# Patient Record
Sex: FEMALE | Race: WHITE | NOT HISPANIC OR LATINO | Employment: FULL TIME | URBAN - METROPOLITAN AREA
[De-identification: names, ages, dates, MRNs, and addresses within clinical notes are randomized per-mention and may not be internally consistent; named-entity substitution may affect disease eponyms.]

---

## 2017-06-16 ENCOUNTER — OFFICE VISIT (OUTPATIENT)
Dept: URGENT CARE | Facility: URGENT CARE | Age: 37
End: 2017-06-16
Payer: COMMERCIAL

## 2017-06-16 VITALS
WEIGHT: 128.4 LBS | OXYGEN SATURATION: 98 % | TEMPERATURE: 98.3 F | SYSTOLIC BLOOD PRESSURE: 102 MMHG | DIASTOLIC BLOOD PRESSURE: 70 MMHG | HEART RATE: 86 BPM

## 2017-06-16 DIAGNOSIS — J20.9 ACUTE BRONCHITIS, UNSPECIFIED ORGANISM: ICD-10-CM

## 2017-06-16 DIAGNOSIS — H10.11 ALLERGIC CONJUNCTIVITIS, RIGHT EYE: Primary | ICD-10-CM

## 2017-06-16 PROCEDURE — 99213 OFFICE O/P EST LOW 20 MIN: CPT | Performed by: PHYSICIAN ASSISTANT

## 2017-06-16 RX ORDER — OLOPATADINE HYDROCHLORIDE 2 MG/ML
1 SOLUTION/ DROPS OPHTHALMIC DAILY
Qty: 2.5 ML | Refills: 0 | Status: SHIPPED | OUTPATIENT
Start: 2017-06-16

## 2017-06-16 ASSESSMENT — ENCOUNTER SYMPTOMS
HEADACHES: 0
COUGH: 1
FOCAL WEAKNESS: 0
FEVER: 0
DIARRHEA: 0
ABDOMINAL PAIN: 0
EYE DISCHARGE: 1
CHILLS: 0
VOMITING: 0
EYE PAIN: 0
NAUSEA: 0
EYE REDNESS: 1
SHORTNESS OF BREATH: 0

## 2017-06-16 NOTE — MR AVS SNAPSHOT
After Visit Summary   6/16/2017    Alan Montejo    MRN: [id number]           Patient Information     Date Of Birth          1980        Visit Information        Provider Department      6/16/2017 1:30 PM Felecia Oconnell PA-C [facility]        Today's Diagnoses     Allergic conjunctivitis, right eye    -  1    Acute bronchitis, unspecified organism          Care Instructions      Conjunctivitis, Allergic    Conjunctivitis is an irritation of a thin membrane in the eye. This membrane is called the conjunctiva. It covers the white of the eye and the inside of the eyelid. The condition is often known as  pink eye  or  red eye  because the eye looks pink or red. The eye can also be swollen. A thick fluid may leak from the eyelid. The eye may itch and burn, and feel gritty or scratchy.  Allergic conjunctivitis is caused by an allergen. Allergens are substances that cause the body to react with certain symptoms. Allergens that cause eye irritation include things such as house dust or pollen in the air. This can occur seasonally, most often in the spring.  Home care    Eye drops may be prescribed to reduce itching and redness. Use these as directed. Otherwise, over-the-counter decongestant eye drops may be used.    Apply a cool compress (towel soaked in cool water) to the affected eye 3 to 4 times a day to reduce swelling and itching.    It is common to have mucus drainage during the night, causing the eyelids to become crusted by morning. Use a warm, wet cloth to wipe this away. You may also use saline irrigating solution or artificial tears to rinse away mucus in the eye. Do not patch the eye.    You may use acetaminophen or ibuprofen to control pain, unless another medicine was prescribed. (Note: If you have chronic liver or kidney disease, or if you have ever had a stomach ulcer or gastrointestinal bleeding, talk with your healthcare provider before using these  medicines.)    Do not wear contact lenses until your eyes have healed and all symptoms are gone.  Follow-up care  Follow up with your healthcare provider, or as advised.  When to seek medical advice  Call your healthcare provider right away if any of these occur:    Increased eyelid swelling    New or worsening drainage from the eye    Increasing redness around the eye    Facial swelling  Date Last Reviewed: 6/14/2015 2000-2017 [employer]. [address] [place] [address], [place] [address]. All rights reserved. This information is not intended as a substitute for professional medical care. Always follow your healthcare professional's instructions.      Viral Upper Respiratory Illness (Adult)  You have a viral upper respiratory illness (URI), which is another term for the common cold. This illness is contagious during the first few days. It is spread through the air by coughing and sneezing. It may also be spread by direct contact (touching the sick person and then touching your own eyes, nose, or mouth). Frequent handwashing will decrease risk of spread. Most viral illnesses go away within 7 to 10 days with rest and simple home remedies. Sometimes the illness may last for several weeks. Antibiotics will not kill a virus, and they are generally not prescribed for this condition.    Home care    If symptoms are severe, rest at home for the first 2 to 3 days. When you resume activity, don't let yourself get too tired.    Avoid being exposed to cigarette smoke (yours or others ).    You may use acetaminophen or ibuprofen to control pain and fever, unless another medicine was prescribed. (Note: If you have chronic liver or kidney disease, have ever had a stomach ulcer or gastrointestinal bleeding, or are taking blood-thinning medicines, talk with your healthcare provider before using these medicines.) Aspirin should never be given to anyone under 18 years of age who is ill with a viral infection or fever. It may  cause severe liver or brain damage.    Your appetite may be poor, so a light diet is fine. Avoid dehydration by drinking 6 to 8 glasses of fluids per day (water, soft drinks, juices, tea, or soup). Extra fluids will help loosen secretions in the nose and lungs.    Over-the-counter cold medicines will not shorten the length of time you re sick, but they may be helpful for the following symptoms: cough, sore throat, and nasal and sinus congestion. (Note: Do not use decongestants if you have high blood pressure.)  Follow-up care  Follow up with your healthcare provider, or as advised.  When to seek medical advice  Call your healthcare provider right away if any of these occur:    Cough with lots of colored sputum (mucus)    Severe headache; face, neck, or ear pain    Difficulty swallowing due to throat pain    Fever of 100.4 F (38 C)  Call 911, or get immediate medical care  Call emergency services right away if any of these occur:    Chest pain, shortness of breath, wheezing, or difficulty breathing    Coughing up blood    Inability to swallow due to throat pain  Date Last Reviewed: 9/13/2015 2000-2017 [employer]. [address] [place] [address]. All rights reserved. This information is not intended as a substitute for professional medical care. Always follow your healthcare professional's instructions.                  Follow-ups after your visit        Follow-up notes from your care team     Return if symptoms worsen or fail to improve.      Who to contact     If you have questions or need follow up information about today's clinic visit or your schedule please contact [facility] directly at [phone].  Normal or non-critical lab and imaging results will be communicated to you by MyChart, letter or phone within 4 business days after the clinic has received the results. If you do not hear from us within 7 days, please contact the clinic through MyChart or phone. If you  "have a critical or abnormal lab result, we will notify you by phone as soon as possible.  Submit refill requests through [employer] or call your pharmacy and they will forward the refill request to us. Please allow 3 business days for your refill to be completed.          Additional Information About Your Visit        [employer] Information     [employer] lets you send messages to your doctor, view your test results, renew your prescriptions, schedule appointments and more. To sign up, go to [email or web address] . Click on \"Log in\" on the left side of the screen, which will take you to the Welcome page. Then click on \"Sign up Now\" on the right side of the page.     You will be asked to enter the access code listed below, as well as some personal information. Please follow the directions to create your username and password.     Your access code is: [email or web address]  Expires: 2017  4:53 PM     Your access code will  in 90 days. If you need help or a new code, please call your [facility] clinic or [phone].        Care EveryWhere ID     This is your Care EveryWhere ID. This could be used by other organizations to access your [facility] medical records  [id number]        Your Vitals Were     Pulse Temperature Pulse Oximetry             86 98.3  F (36.8  C) (Tympanic) 98%          Blood Pressure from Last 3 Encounters:   17 102/70   04/10/11 110/60   11 116/64    Weight from Last 3 Encounters:   17 128 lb 6.4 oz (58.2 kg)              Today, you had the following     No orders found for display         Today's Medication Changes          These changes are accurate as of: 17  4:53 PM.  If you have any questions, ask your nurse or doctor.               Start taking these medicines.        Dose/Directions    olopatadine HCl 0.2 % Soln   Commonly known as:  PATADAY   Used for:  Allergic conjunctivitis, right eye   Started by:  Felecia Oconnell PA-C        Dose:  1 drop   Place 1 drop into " the right eye daily   Quantity:  2.5 mL   Refills:  0            Where to get your medicines      These medications were sent to [id number] - [place], [place] - [address] AT [facility]  [address], [place] [address]    Hours:  24-hours Phone:  [phone]     olopatadine HCl 0.2 % Soln                Primary Care Provider    None Specified       No primary provider on file.        Thank you!     Thank you for choosing [facility]  for your care. Our goal is always to provide you with excellent care. Hearing back from our patients is one way we can continue to improve our services. Please take a few minutes to complete the written survey that you may receive in the mail after your visit with us. Thank you!             Your Updated Medication List - Protect others around you: Learn how to safely use, store and throw away your medicines at www.disposemymeds.org.          This list is accurate as of: 6/16/17  4:53 PM.  Always use your most recent med list.                   Brand Name Dispense Instructions for use    azithromycin 250 MG tablet    ZITHROMAX    6 tablet    Two tablets first day, then one tablet daily for four days       NORTREL 0.5/35 (28) 0.5-35 MG-MCG per tablet   Generic drug:  norethindrone-ethinyl estradiol      Take 1 tablet by mouth daily.       olopatadine HCl 0.2 % Soln    PATADAY    2.5 mL    Place 1 drop into the right eye daily       TYLENOL 500 MG tablet   Generic drug:  acetaminophen      1 TABLET EVERY 4 HOURS AS NEEDED       VALTREX PO      Take  by mouth as needed.

## 2017-06-16 NOTE — PATIENT INSTRUCTIONS
Conjunctivitis, Allergic    Conjunctivitis is an irritation of a thin membrane in the eye. This membrane is called the conjunctiva. It covers the white of the eye and the inside of the eyelid. The condition is often known as  pink eye  or  red eye  because the eye looks pink or red. The eye can also be swollen. A thick fluid may leak from the eyelid. The eye may itch and burn, and feel gritty or scratchy.  Allergic conjunctivitis is caused by an allergen. Allergens are substances that cause the body to react with certain symptoms. Allergens that cause eye irritation include things such as house dust or pollen in the air. This can occur seasonally, most often in the spring.  Home care    Eye drops may be prescribed to reduce itching and redness. Use these as directed. Otherwise, over-the-counter decongestant eye drops may be used.    Apply a cool compress (towel soaked in cool water) to the affected eye 3 to 4 times a day to reduce swelling and itching.    It is common to have mucus drainage during the night, causing the eyelids to become crusted by morning. Use a warm, wet cloth to wipe this away. You may also use saline irrigating solution or artificial tears to rinse away mucus in the eye. Do not patch the eye.    You may use acetaminophen or ibuprofen to control pain, unless another medicine was prescribed. (Note: If you have chronic liver or kidney disease, or if you have ever had a stomach ulcer or gastrointestinal bleeding, talk with your healthcare provider before using these medicines.)    Do not wear contact lenses until your eyes have healed and all symptoms are gone.  Follow-up care  Follow up with your healthcare provider, or as advised.  When to seek medical advice  Call your healthcare provider right away if any of these occur:    Increased eyelid swelling    New or worsening drainage from the eye    Increasing redness around the eye    Facial swelling  Date Last Reviewed: 6/14/2015 2000-2017 The  [employer]. [address] [place] [address]. All rights reserved. This information is not intended as a substitute for professional medical care. Always follow your healthcare professional's instructions.      Viral Upper Respiratory Illness (Adult)  You have a viral upper respiratory illness (URI), which is another term for the common cold. This illness is contagious during the first few days. It is spread through the air by coughing and sneezing. It may also be spread by direct contact (touching the sick person and then touching your own eyes, nose, or mouth). Frequent handwashing will decrease risk of spread. Most viral illnesses go away within 7 to 10 days with rest and simple home remedies. Sometimes the illness may last for several weeks. Antibiotics will not kill a virus, and they are generally not prescribed for this condition.    Home care    If symptoms are severe, rest at home for the first 2 to 3 days. When you resume activity, don't let yourself get too tired.    Avoid being exposed to cigarette smoke (yours or others ).    You may use acetaminophen or ibuprofen to control pain and fever, unless another medicine was prescribed. (Note: If you have chronic liver or kidney disease, have ever had a stomach ulcer or gastrointestinal bleeding, or are taking blood-thinning medicines, talk with your healthcare provider before using these medicines.) Aspirin should never be given to anyone under 18 years of age who is ill with a viral infection or fever. It may cause severe liver or brain damage.    Your appetite may be poor, so a light diet is fine. Avoid dehydration by drinking 6 to 8 glasses of fluids per day (water, soft drinks, juices, tea, or soup). Extra fluids will help loosen secretions in the nose and lungs.    Over-the-counter cold medicines will not shorten the length of time you re sick, but they may be helpful for the following symptoms: cough, sore throat, and nasal and sinus  congestion. (Note: Do not use decongestants if you have high blood pressure.)  Follow-up care  Follow up with your healthcare provider, or as advised.  When to seek medical advice  Call your healthcare provider right away if any of these occur:    Cough with lots of colored sputum (mucus)    Severe headache; face, neck, or ear pain    Difficulty swallowing due to throat pain    Fever of 100.4 F (38 C)  Call 911, or get immediate medical care  Call emergency services right away if any of these occur:    Chest pain, shortness of breath, wheezing, or difficulty breathing    Coughing up blood    Inability to swallow due to throat pain  Date Last Reviewed: 9/13/2015 2000-2017 [employer]. [address] [place] [address], [place] [address]. All rights reserved. This information is not intended as a substitute for professional medical care. Always follow your healthcare professional's instructions.

## 2017-06-16 NOTE — NURSING NOTE
Chief Complaint   Patient presents with     Urgent Care     Eye Problem     Pt states has had redness and crust in right eye since this morning.     Cough     Pt states has had cough, ear plugging, and sinus pressure.        Initial /70 (BP Location: Right arm, Patient Position: Chair, Cuff Size: Adult Regular)  Pulse 86  Temp 98.3  F (36.8  C) (Tympanic)  Wt 128 lb 6.4 oz (58.2 kg)  SpO2 98% There is no height or weight on file to calculate BMI.  Medication Reconciliation: unable or not appropriate to perform   Parvin Fink CMA (AAMA) 6/16/2017 1:53 PM

## 2017-06-16 NOTE — PROGRESS NOTES
HPI    June 16, 2017    HPI: Alan Montejo is a 37 year old female who complains of moderate ear presure, dry cough & congestion onset 8 days ago. She notes cough has improved since onset. She does have a history of seasonal allergies. She woke up this AM with R eye redness and crusty drainage. Eye was not matted shut. Eye is also itchy. Symptoms are constant in duration. No treatments tried. Denies eye pain, vision changes, fever/chills, HA, CP, SOB, abd pain, N/V/D, rash, or any other symptoms. Patient is breastfeeding.    No past medical history on file.  No past surgical history on file.  Social History   Substance Use Topics     Smoking status: Former Smoker     Quit date: 4/8/2006     Smokeless tobacco: Never Used     Alcohol use Not on file     Problem list, Medication list, Allergies, and Medical/Social/Surgical histories reviewed in [facility] and updated as appropriate.    Review of Systems   Constitutional: Negative for chills and fever.   HENT: Positive for congestion.         Ear pressure   Eyes: Positive for discharge and redness. Negative for pain.        Eye itching   Respiratory: Positive for cough. Negative for shortness of breath.    Cardiovascular: Negative for chest pain.   Gastrointestinal: Negative for abdominal pain, diarrhea, nausea and vomiting.   Skin: Negative for rash.   Neurological: Negative for focal weakness and headaches.   All other systems reviewed and are negative.        Physical Exam   Constitutional: She is oriented to person, place, and time and well-developed, well-nourished, and in no distress.   HENT:   Head: Normocephalic and atraumatic.   Right Ear: Tympanic membrane, external ear and ear canal normal.   Left Ear: Tympanic membrane, external ear and ear canal normal.   Nose: Mucosal edema present.   Mouth/Throat: Uvula is midline, oropharynx is clear and moist and mucous membranes are normal.   Eyes: EOM are normal. Pupils are equal, round, and reactive to light. Right eye  exhibits exudate. Right conjunctiva is injected. Left conjunctiva is not injected.   Cardiovascular: Normal rate, regular rhythm and normal heart sounds.    Pulmonary/Chest: Effort normal and breath sounds normal.   Musculoskeletal: Normal range of motion.   Neurological: She is alert and oriented to person, place, and time. Gait normal.   Skin: Skin is warm and dry.   Nursing note and vitals reviewed.      Vital Signs  /70 (BP Location: Right arm, Patient Position: Chair, Cuff Size: Adult Regular)  Pulse 86  Temp 98.3  F (36.8  C) (Tympanic)  Wt 128 lb 6.4 oz (58.2 kg)  SpO2 98%     Diagnostic Test Results:  none     ASSESSMENT/PLAN      ICD-10-CM    1. Allergic conjunctivitis, right eye H10.11 olopatadine HCl (PATADAY) 0.2 % SOLN   2. Acute bronchitis, unspecified organism J20.9       PERRLA, EOMI- suspect allergic conjunctivitis. Rx Pataday.    Lungs CTAB, afebrile, cough is improving. Suspect viral bronchitis- pt does not want any cough medicine as she is breastfeeding.    I have discussed any lab or imaging results, the patient's diagnosis, and my plan of treatment with the patient and/or family. Patient is aware to come back in if with worsening symptoms or if no relief despite treatment plan.  Patient voiced understanding and had no further questions.       Follow Up: Data Unavailable    LOTUS Stanley, [facility]

## 2018-10-10 ENCOUNTER — OFFICE VISIT - HEALTHEAST (OUTPATIENT)
Dept: FAMILY MEDICINE | Facility: CLINIC | Age: 38
End: 2018-10-10

## 2018-10-10 DIAGNOSIS — N64.4 BREAST PAIN: ICD-10-CM

## 2018-10-10 DIAGNOSIS — F41.9 ANXIETY: ICD-10-CM

## 2018-10-15 ENCOUNTER — HOSPITAL ENCOUNTER (OUTPATIENT)
Dept: MAMMOGRAPHY | Facility: CLINIC | Age: 38
Discharge: HOME | End: 2018-10-15
Attending: FAMILY MEDICINE

## 2018-10-15 ENCOUNTER — HOSPITAL ENCOUNTER (OUTPATIENT)
Dept: ULTRASOUND IMAGING | Facility: CLINIC | Age: 38
Discharge: HOME | End: 2018-10-15
Attending: FAMILY MEDICINE

## 2018-10-15 DIAGNOSIS — N64.4 BREAST PAIN: ICD-10-CM

## 2019-09-30 ENCOUNTER — COMMUNICATION - HEALTHEAST (OUTPATIENT)
Dept: FAMILY MEDICINE | Facility: CLINIC | Age: 39
End: 2019-09-30

## 2019-09-30 DIAGNOSIS — F41.9 ANXIETY: ICD-10-CM

## 2019-10-02 ENCOUNTER — OFFICE VISIT - HEALTHEAST (OUTPATIENT)
Dept: FAMILY MEDICINE | Facility: CLINIC | Age: 39
End: 2019-10-02

## 2019-10-02 DIAGNOSIS — D25.9 UTERINE LEIOMYOMA, UNSPECIFIED LOCATION: ICD-10-CM

## 2019-10-02 DIAGNOSIS — E78.00 PURE HYPERCHOLESTEROLEMIA: ICD-10-CM

## 2019-10-02 DIAGNOSIS — N64.3 GALACTORRHEA: ICD-10-CM

## 2019-10-02 DIAGNOSIS — Z00.00 ROUTINE GENERAL MEDICAL EXAMINATION AT A HEALTH CARE FACILITY: ICD-10-CM

## 2019-10-02 DIAGNOSIS — F41.9 ANXIETY: ICD-10-CM

## 2019-10-02 DIAGNOSIS — A60.00 GENITAL HERPES: ICD-10-CM

## 2019-10-02 LAB
ALBUMIN SERPL-MCNC: 4.5 G/DL (ref 3.5–5)
ALP SERPL-CCNC: 72 U/L (ref 45–120)
ALT SERPL W P-5'-P-CCNC: 16 U/L (ref 0–45)
ANION GAP SERPL CALCULATED.3IONS-SCNC: 10 MMOL/L (ref 5–18)
AST SERPL W P-5'-P-CCNC: 19 U/L (ref 0–40)
BILIRUB SERPL-MCNC: 0.6 MG/DL (ref 0–1)
BUN SERPL-MCNC: 11 MG/DL (ref 8–22)
CALCIUM SERPL-MCNC: 9.6 MG/DL (ref 8.5–10.5)
CHLORIDE BLD-SCNC: 103 MMOL/L (ref 98–107)
CHOLEST SERPL-MCNC: 234 MG/DL
CO2 SERPL-SCNC: 25 MMOL/L (ref 22–31)
CREAT SERPL-MCNC: 0.81 MG/DL (ref 0.6–1.1)
FASTING STATUS PATIENT QL REPORTED: ABNORMAL
GFR SERPL CREATININE-BSD FRML MDRD: >60 ML/MIN/1.73M2
GLUCOSE BLD-MCNC: 82 MG/DL (ref 70–125)
HDLC SERPL-MCNC: 62 MG/DL
LDLC SERPL CALC-MCNC: 158 MG/DL
POTASSIUM BLD-SCNC: 4.1 MMOL/L (ref 3.5–5)
PROLACTIN SERPL-MCNC: 6.7 NG/ML (ref 0–20)
PROT SERPL-MCNC: 7.6 G/DL (ref 6–8)
SODIUM SERPL-SCNC: 138 MMOL/L (ref 136–145)
TRIGL SERPL-MCNC: 69 MG/DL

## 2019-10-02 ASSESSMENT — PATIENT HEALTH QUESTIONNAIRE - PHQ9: SUM OF ALL RESPONSES TO PHQ QUESTIONS 1-9: 1

## 2019-10-02 ASSESSMENT — MIFFLIN-ST. JEOR: SCORE: 1274.73

## 2019-10-02 ASSESSMENT — ANXIETY QUESTIONNAIRES
3. WORRYING TOO MUCH ABOUT DIFFERENT THINGS: NOT AT ALL
2. NOT BEING ABLE TO STOP OR CONTROL WORRYING: NOT AT ALL
6. BECOMING EASILY ANNOYED OR IRRITABLE: SEVERAL DAYS
GAD7 TOTAL SCORE: 1
4. TROUBLE RELAXING: NOT AT ALL
1. FEELING NERVOUS, ANXIOUS, OR ON EDGE: NOT AT ALL
IF YOU CHECKED OFF ANY PROBLEMS ON THIS QUESTIONNAIRE, HOW DIFFICULT HAVE THESE PROBLEMS MADE IT FOR YOU TO DO YOUR WORK, TAKE CARE OF THINGS AT HOME, OR GET ALONG WITH OTHER PEOPLE: NOT DIFFICULT AT ALL
7. FEELING AFRAID AS IF SOMETHING AWFUL MIGHT HAPPEN: NOT AT ALL
5. BEING SO RESTLESS THAT IT IS HARD TO SIT STILL: NOT AT ALL

## 2019-10-03 ENCOUNTER — COMMUNICATION - HEALTHEAST (OUTPATIENT)
Dept: FAMILY MEDICINE | Facility: CLINIC | Age: 39
End: 2019-10-03

## 2019-10-03 DIAGNOSIS — N64.3 GALACTORRHEA: ICD-10-CM

## 2019-10-03 LAB
HPV SOURCE: NORMAL
HUMAN PAPILLOMA VIRUS 16 DNA: NEGATIVE
HUMAN PAPILLOMA VIRUS 18 DNA: NEGATIVE
HUMAN PAPILLOMA VIRUS FINAL DIAGNOSIS: NORMAL
HUMAN PAPILLOMA VIRUS OTHER HR: NEGATIVE
SPECIMEN DESCRIPTION: NORMAL

## 2019-10-09 LAB
BKR LAB AP ABNORMAL BLEEDING: NO
BKR LAB AP BIRTH CONTROL/HORMONES: NORMAL
BKR LAB AP CERVICAL APPEARANCE: NORMAL
BKR LAB AP GYN ADEQUACY: NORMAL
BKR LAB AP GYN INTERPRETATION: NORMAL
BKR LAB AP HPV REFLEX: NORMAL
BKR LAB AP LMP: NORMAL
BKR LAB AP PATIENT STATUS: NORMAL
BKR LAB AP PREVIOUS ABNORMAL: NORMAL
BKR LAB AP PREVIOUS NORMAL: NORMAL
HIGH RISK?: YES
PATH REPORT.COMMENTS IMP SPEC: NORMAL
RESULT FLAG (HE HISTORICAL CONVERSION): NORMAL

## 2019-10-11 ENCOUNTER — COMMUNICATION - HEALTHEAST (OUTPATIENT)
Dept: FAMILY MEDICINE | Facility: CLINIC | Age: 39
End: 2019-10-11

## 2019-10-15 ENCOUNTER — COMMUNICATION - HEALTHEAST (OUTPATIENT)
Dept: FAMILY MEDICINE | Facility: CLINIC | Age: 39
End: 2019-10-15

## 2019-10-24 ENCOUNTER — HOSPITAL ENCOUNTER (OUTPATIENT)
Dept: SURGERY | Facility: CLINIC | Age: 39
Discharge: HOME | End: 2019-10-24
Attending: FAMILY MEDICINE

## 2019-10-24 DIAGNOSIS — N64.3 GALACTORRHEA: ICD-10-CM

## 2019-10-24 ASSESSMENT — MIFFLIN-ST. JEOR: SCORE: 1303.65

## 2019-12-28 ENCOUNTER — COMMUNICATION - HEALTHEAST (OUTPATIENT)
Dept: FAMILY MEDICINE | Facility: CLINIC | Age: 39
End: 2019-12-28

## 2019-12-28 DIAGNOSIS — F41.9 ANXIETY: ICD-10-CM

## 2020-01-08 ENCOUNTER — COMMUNICATION - HEALTHEAST (OUTPATIENT)
Dept: FAMILY MEDICINE | Facility: CLINIC | Age: 40
End: 2020-01-08

## 2020-01-08 DIAGNOSIS — F41.9 ANXIETY: ICD-10-CM

## 2020-02-04 ENCOUNTER — COMMUNICATION - HEALTHEAST (OUTPATIENT)
Dept: SCHEDULING | Facility: CLINIC | Age: 40
End: 2020-02-04

## 2020-02-05 ENCOUNTER — COMMUNICATION - HEALTHEAST (OUTPATIENT)
Dept: SCHEDULING | Facility: CLINIC | Age: 40
End: 2020-02-05

## 2020-02-05 ENCOUNTER — VIRTUAL VISIT (OUTPATIENT)
Dept: FAMILY MEDICINE | Facility: OTHER | Age: 40
End: 2020-02-05

## 2020-02-05 NOTE — PROGRESS NOTES
"Date: 2020 12:11:53  Clinician: Neymar Laboy  Clinician NPI: [id number]  Patient: Alan Montejo  Patient : 1980  Patient Address: [address], [place] [address]  Patient Phone: [phone]  Visit Protocol: URI  Patient Summary:  Alan is a 39 year old ( : 1980 ) female who initiated a Visit for cold, sinus infection, or influenza. When asked the question \"Please sign me up to receive news, health information and promotions. \", Alan responded \"No\".    Alan states her symptoms started today.   Her symptoms consist of a cough, facial pain or pressure, myalgia, a headache, chills, malaise, nasal congestion, and rhinitis. She is experiencing difficulty breathing due to nasal congestion but she is not short of breath.   Symptom details     Nasal secretions: The color of her mucus is clear.    Cough: Alan coughs a few times an hour and her cough is not more bothersome at night. Phlegm comes into her throat when she coughs. She does not believe the phlegm causes the cough. The color of the phlegm is clear.     Facial pain or pressure: The facial pain or pressure feels worse when bending over or leaning forward.     Headache: She states the headache is severe (7-9 on a 10 point pain scale).      Alan denies having sore throat, ear pain, enlarged lymph nodes, fever, wheezing, and teeth pain. She also denies having recent facial or sinus surgery in the past 60 days and taking antibiotic medication for the symptoms.   Precipitating events  She has recently been exposed to someone with influenza. Alan has been in close contact with the following high risk individuals: children under the age of 5.   Alan has not traveled internationally in the last 14 days before the start of her symptoms.   Pertinent medical history  Alan does not get yeast infections when she takes antibiotics.   Weight: 140 lbs   Alan does not smoke or use smokeless tobacco.   She denies pregnancy and denies " breastfeeding. Her last period was over a month ago.   Weight: 140 lbs    MEDICATIONS: Celexa oral, Daytime Cold-Flu oral, ALLERGIES: NKDA  Clinician Response:  Dear Alan,  Based on the information provided, you have a viral upper respiratory infection, otherwise known as a cold. Symptoms vary from person to person, but can include sneezing, coughing, a runny nose, sore throat, and headache and range from mild to severe.  Unfortunately, there are no medications that can cure a cold, so treatment is focused on controlling symptoms as much as possible. Most people gradually feel better until symptoms are gone in 1-2 weeks.  Medication information  Because you have a viral infection, antibiotics will not help you get better. Treating a viral infection with antibiotics could actually make you feel worse.  For more information on why I am not prescribing antibiotics, please watch this video: Antibiotics Aren't Always the Answer.  I am prescribing:       Fluticasone 50 mcg/actuation nasal spray. Inhale 2 sprays in each nostril 1 time per day; after 1 week, may adjust to 1 - 2 sprays in each nostril 1 time per day. This medication takes several days to start working, so keep taking it even if it doesn't help right away. There are no refills with this prescription.      Benzonatate (Tessalon Perles) 100 mg oral capsule. Take 1-2 capsules by mouth 3 times per day as needed for your cough. There are no refills with this prescription.     Unless you are allergic to the over-the-counter medication(s) below, I recommend using:       Acetaminophen (Tylenol or store brand) oral tablet. Take 1-2 tablets by mouth every 4-6 hours to help with the discomfort.      Ibuprofen (Advil or store brand) 200 mg oral tablet. Take 1-3 tablets (200-600 mg) by mouth every 8 hours to help with the discomfort. Make sure to take the ibuprofen with food. Do not exceed 2400 mg in 24 hours.    An antihistamine such as Benadryl, Claritin, or store  brand.    A decongestant such as Sudafed PE or store brand.    A sinus irrigation kit such as Sinus Rinse, Neti Pot, SinuCleanse, or store brand. Be sure to use sterile or previously boiled water to prevent unwanted infections.     Over-the-counter medications do not require a prescription. Ask the pharmacist if you have any questions.  Self care  The following tips will keep you as comfortable as possible while you recover:     Rest    Drink plenty of water and other liquids    Take a hot shower to loosen congestion    Take a spoonful of honey to reduce your cough     When to seek care  Please be seen in a clinic or urgent care if new symptoms develop, or symptoms become worse.   Diagnosis: Viral URI  Diagnosis ICD: J06.9  Prescription: benzonatate (Tessalon Perles) 100 mg oral capsule 30 capsule, 5 days supply. Take 1-2 capsules by mouth 3 times per day as needed. Refills: 0, Refill as needed: no, Allow substitutions: yes  Prescription: fluticasone 50 mcg/actuation nasal spray,suspension 1 120 spray aerosol with adapter (grams), 30 days supply. Inhale 2 sprays in each nostril 1 time per day; after 1 week, may adjust to 1 - 2 sprays in each nostril 1 time per day.. Refills: 0, Refill as needed: no, Allow substitutions: yes

## 2020-09-17 ENCOUNTER — COMMUNICATION - HEALTHEAST (OUTPATIENT)
Dept: FAMILY MEDICINE | Facility: CLINIC | Age: 40
End: 2020-09-17

## 2020-09-17 DIAGNOSIS — F41.9 ANXIETY: ICD-10-CM

## 2020-11-13 ENCOUNTER — COMMUNICATION - HEALTHEAST (OUTPATIENT)
Dept: FAMILY MEDICINE | Facility: CLINIC | Age: 40
End: 2020-11-13

## 2020-11-13 ENCOUNTER — OFFICE VISIT - HEALTHEAST (OUTPATIENT)
Dept: FAMILY MEDICINE | Facility: CLINIC | Age: 40
End: 2020-11-13

## 2020-11-13 DIAGNOSIS — B37.31 CANDIDIASIS OF VAGINA: ICD-10-CM

## 2020-11-13 DIAGNOSIS — N64.3 GALACTORRHEA: ICD-10-CM

## 2020-11-13 DIAGNOSIS — Z00.00 ROUTINE GENERAL MEDICAL EXAMINATION AT A HEALTH CARE FACILITY: ICD-10-CM

## 2020-11-13 DIAGNOSIS — L20.9 ATOPIC DERMATITIS: ICD-10-CM

## 2020-11-13 DIAGNOSIS — Z76.89 ENCOUNTER TO ESTABLISH CARE: ICD-10-CM

## 2020-11-13 DIAGNOSIS — F41.1 GENERALIZED ANXIETY DISORDER: ICD-10-CM

## 2020-11-13 DIAGNOSIS — N89.8 VAGINAL DISCHARGE: ICD-10-CM

## 2020-11-13 DIAGNOSIS — Z13.228 SCREENING FOR METABOLIC DISORDER: ICD-10-CM

## 2020-11-13 DIAGNOSIS — F43.20 ANTICIPATORY GRIEF: ICD-10-CM

## 2020-11-13 DIAGNOSIS — Z30.432 ENCOUNTER FOR IUD REMOVAL: ICD-10-CM

## 2020-11-13 LAB
CHOLEST SERPL-MCNC: 220 MG/DL
CLUE CELLS: ABNORMAL
FASTING STATUS PATIENT QL REPORTED: NO
HBA1C MFR BLD: 5.2 %
HDLC SERPL-MCNC: 58 MG/DL
LDLC SERPL CALC-MCNC: 147 MG/DL
PROLACTIN SERPL-MCNC: 7.1 NG/ML (ref 0–20)
TRICHOMONAS, WET PREP: ABNORMAL
TRIGL SERPL-MCNC: 77 MG/DL
TSH SERPL DL<=0.005 MIU/L-ACNC: 2.22 UIU/ML (ref 0.3–5)
YEAST, WET PREP: ABNORMAL

## 2020-11-13 ASSESSMENT — ANXIETY QUESTIONNAIRES
GAD7 TOTAL SCORE: 2
IF YOU CHECKED OFF ANY PROBLEMS ON THIS QUESTIONNAIRE, HOW DIFFICULT HAVE THESE PROBLEMS MADE IT FOR YOU TO DO YOUR WORK, TAKE CARE OF THINGS AT HOME, OR GET ALONG WITH OTHER PEOPLE: NOT DIFFICULT AT ALL
7. FEELING AFRAID AS IF SOMETHING AWFUL MIGHT HAPPEN: NOT AT ALL
1. FEELING NERVOUS, ANXIOUS, OR ON EDGE: SEVERAL DAYS
4. TROUBLE RELAXING: NOT AT ALL
6. BECOMING EASILY ANNOYED OR IRRITABLE: SEVERAL DAYS
5. BEING SO RESTLESS THAT IT IS HARD TO SIT STILL: NOT AT ALL
2. NOT BEING ABLE TO STOP OR CONTROL WORRYING: NOT AT ALL
3. WORRYING TOO MUCH ABOUT DIFFERENT THINGS: NOT AT ALL

## 2020-11-13 ASSESSMENT — MIFFLIN-ST. JEOR: SCORE: 1298.92

## 2020-11-13 ASSESSMENT — PATIENT HEALTH QUESTIONNAIRE - PHQ9: SUM OF ALL RESPONSES TO PHQ QUESTIONS 1-9: 5

## 2020-11-16 ENCOUNTER — COMMUNICATION - HEALTHEAST (OUTPATIENT)
Dept: FAMILY MEDICINE | Facility: CLINIC | Age: 40
End: 2020-11-16

## 2021-01-15 ENCOUNTER — HEALTH MAINTENANCE LETTER (OUTPATIENT)
Age: 41
End: 2021-01-15

## 2021-03-15 ENCOUNTER — COMMUNICATION - HEALTHEAST (OUTPATIENT)
Dept: ADMINISTRATIVE | Facility: CLINIC | Age: 41
End: 2021-03-15

## 2021-03-15 DIAGNOSIS — F41.9 ANXIETY: ICD-10-CM

## 2021-03-22 ENCOUNTER — COMMUNICATION - HEALTHEAST (OUTPATIENT)
Dept: FAMILY MEDICINE | Facility: CLINIC | Age: 41
End: 2021-03-22

## 2021-03-31 ENCOUNTER — COMMUNICATION - HEALTHEAST (OUTPATIENT)
Dept: SCHEDULING | Facility: CLINIC | Age: 41
End: 2021-03-31

## 2021-05-26 ASSESSMENT — PATIENT HEALTH QUESTIONNAIRE - PHQ9: SUM OF ALL RESPONSES TO PHQ QUESTIONS 1-9: 1

## 2021-05-27 ASSESSMENT — PATIENT HEALTH QUESTIONNAIRE - PHQ9: SUM OF ALL RESPONSES TO PHQ QUESTIONS 1-9: 5

## 2021-05-28 ASSESSMENT — ANXIETY QUESTIONNAIRES
GAD7 TOTAL SCORE: 1
GAD7 TOTAL SCORE: 2

## 2021-06-01 NOTE — TELEPHONE ENCOUNTER
Refill Approved    Rx renewed per Medication Renewal Policy. Medication was last renewed on 10.10.18.  Has physical scheduled 10/2.    Madelaine Willis, [facility] [place] Connection Triage/Med Refill 9/30/2019     Requested Prescriptions   Pending Prescriptions Disp Refills     citalopram (CELEXA) 20 MG tablet [Pharmacy Med Name: CITALOPRAM TAB 20MG] 90 tablet 3     Sig: TAKE 1 TABLET DAILY       SSRI Refill Protocol  Passed - 9/30/2019  3:08 AM        Passed - PCP or prescribing provider visit in last year     Last office visit with prescriber/PCP: 10/10/2018 Cindy Ibarra MD OR same dept: 10/10/2018 Cindy Ibarra MD OR same specialty: 10/10/2018 Cindy Ibarra MD  Last physical: 10/8/2015 Last MTM visit: Visit date not found   Next visit within 3 mo: Visit date not found  Next physical within 3 mo: Visit date not found  Prescriber OR PCP: Cindy Ibarra MD  Last diagnosis associated with med order: 1. Anxiety  - citalopram (CELEXA) 20 MG tablet [Pharmacy Med Name: CITALOPRAM TAB 20MG]; TAKE 1 TABLET DAILY  Dispense: 90 tablet; Refill: 3    If protocol passes may refill for 12 months if within 3 months of last provider visit (or a total of 15 months).

## 2021-06-01 NOTE — PROGRESS NOTES
FEMALE ADULT PREVENTIVE EXAM    CHIEF COMPLAINT:  Female preventive exam.    SUBJECTIVE:  Alan Montejo is a 39 y.o. female who presents for her routine physical exam.    Patient would like to address the following concerns today:     Mother was diagnosed with stage IV pancreatic cancer diagnosed in January.  Doing chemo right now.      Still having bilateral milky discharge.  Left > right.  Like milk.  In October, I saw her for this.  At the time, she had a small red area of hyperemia that was slightly tender.  She was treated empirically with dicloxacillin.  She notes at that time when she was massaging, the discharge that came out was yellowish and had an odor.  She is quite sure that it was infection.  She had a mammogram and ultrasound at that time and it was negative.  Since then, she thought it was cleared up.  However, she had continued to have a clear milk discharge.  She has not  in 1.5 years.  Has an IUD in place.  She was an overproducer.  She was able to breastfeed twins and then also donated milk to 2-3 other friends based on her freezer stores.      Anxiety: 20 mg citalopram, no issues with side effects.  Has been working great.  Anxiety got much worse after nursing.  Feels like normal baseline.    GYNE HISTORY  Menses: no menses.  Sexually Active: with .  Contraception: Mirena: February 2017.   is thinking vasectomy.  Last Pap: 2016 - normal.  Abnormal Pap: 2015 - LGSIL - mild dysplasia on colposcopy.  Vaginal Discharge: none      She  has a past medical history of Anxiety, Drug dependence, in remission (H), and Hyperlipidemia.    Lab Results   Component Value Date    WBC 7.3 04/15/2011    HGB 14.4 04/15/2011    HCT 42.2 04/15/2011    MCV 89 04/15/2011     04/15/2011     10/02/2019    K 4.1 10/02/2019    BUN 11 10/02/2019     Lab Results   Component Value Date    CHOL 234 (H) 10/02/2019    HDL 62 10/02/2019    LDLCALC 158 (H) 10/02/2019    TRIG 69 10/02/2019      Lab Results   Component Value Date    TSH 1.2 04/15/2011     BP Readings from Last 3 Encounters:   10/02/19 116/62   10/10/18 (!) 88/62   02/11/16 110/60       Surgeries:    Past Surgical History:   Procedure Laterality Date     NO PAST SURGERIES         Family History:  Her family history includes Alcohol abuse in her father, paternal grandfather, and paternal grandmother; Breast cancer (age of onset: 60) in her paternal grandmother; COPD in her paternal uncle; Cancer in her paternal grandmother; Cancer (age of onset: 50) in her maternal uncle; Cancer (age of onset: 72) in her mother; Dementia in her maternal grandfather; Kidney cancer (age of onset: 69) in her father; Metabolic syndrome in her father.    Social History:  She  reports that she quit smoking about 12 years ago. She smoked 0.00 packs per day. She has never used smokeless tobacco. She reports that she does not drink alcohol or use drugs.    Medications:    Current Outpatient Medications:      citalopram (CELEXA) 20 MG tablet, TAKE 1 TABLET DAILY, Disp: 90 tablet, Rfl: 3     LACTOBACILLUS ACIDOPHILUS (PROBIOTIC ORAL), Take by mouth., Disp: , Rfl:      levonorgestrel (MIRENA UTRN), by Intrauterine route., Disp: , Rfl:      valACYclovir (VALTREX) 1000 MG tablet, Take 1 tablet (1,000 mg total) by mouth every 12 (twelve) hours., Disp: 6 tablet, Rfl: 6      Allergies:  No latex allergies.  No Known Allergies         RISK BEHAVIOR & HEALTH HABITS  Self Breast Exam: yes  Regular Exercise: twice a week - 5mile walk run.    Balanced diet: yes  Seat Belt Use: yes  Calcium intake/Osteoporosis prevention: Dairy products, discussed Vitamin D.    Guns: NO  Sun Screen: YES  Dental Care: YES    REVIEW OF SYSTEMS:  Complete head to toe review of systems is otherwise negative except as above.    OBJECTIVE:  VITAL SIGNS:    Vitals:    10/02/19 1037   BP: 116/62   Pulse: 69   Resp: 16   Temp: 97.9  F (36.6  C)   SpO2: 99%     GENERAL:  Patient alert, in no acute  distress.  EYES: PERRLA. Extraoccular movements intact, pupils equal, reactive to light and accommodation.  Normal conjunctiva and lids.  Undilated fudoscopic exam normal, including normal size, appearance cup-to-disc ratio.  Normal posterior segments, including no exudates or hemorrhages.  ENT:  Hearing grossly normal.  Normal appearance to ears and nose.  Bilateral TM s, external canals, oropharynx normal. Normal lips, gums and teeth.  Normal nasal mucosa, septum and turbinates.  NECK:  Supple, without thyromegaly or mass.  RESP:  Clear to auscultation without crackles, wheezes or distress.  Normal respiratory effort.   CV:  Regular rate and rhythm without murmurs, rubs or gallops.  Normal carotid, abdominal aorta, femoral and pedal pulses.  No varicosities or edema.  ABDOMEN:  Soft, non-tender, without hepatosplenomegaly, masses, or hernias.   BREASTS:  Nontender, without masses, nipple discharge, erythema, or axillary adenopathy.    :    External genitalia:  Normal without lesions.  Urethra: Normal appearing  Vagina: Normal without discharge  Cervix: IUD string in place.  Uterus:  Nontender, mobile, without masses  Ovaries: Normal without masses  LYMPHATIC: Normal palpation of neck, groin and axilla..  No lymphadenopathy.  No bruising.  NEURO:  CN II-XII intact, motor & sensory function all intact.  DTR and reflexes normal.  PSYCHIATRIC:  Alert & oriented with normal mood and affect.  Good judgment and insight.  SKIN:  Normal inspection and palpation.  MUSCULOSKELETAL: Normal gait and station.  - Spine / Ribs / Pelvis: Normal inspection, ROM, stability and strength: Spine, Head, Neck, Upper and Lower Extremities.    ASSESSMENT & PLAN  Alan was seen today for annual exam.    Diagnoses and all orders for this visit:    Routine general medical examination at a health care facility  -     Gynecologic Cytology (PAP Smear)  -     HPV High Risk DNA Cervical  Due for pap today.  Discussed ACOG guidelines.  Reviewed  family history for high risk screening.  Vaccinations reviewed.  She would like a flu shot today.  She is managing the stress in her life okay.    Galactorrhea  -     Prolactin  Patient has not been breastfeeding for 1.5 years.  She had an infection in October.  Negative diagnostic ultrasound and mammogram at that time.  She is an overproducer so will check a prolactin level.  If negative, would recommend meeting with surgery for further recommendations.    Genital herpes  -     valACYclovir (VALTREX) 1000 MG tablet; Take 1 tablet (1,000 mg total) by mouth every 12 (twelve) hours.  Patient needs a refill of medication.  She is taking this on a prn basis as breakouts are rare.    Anxiety  -     citalopram (CELEXA) 20 MG tablet; TAKE 1 TABLET DAILY  Doing well on current dose.  Will refill today.      Hypercholesterolemia  -     Lipid Cascade FASTING  -     Comprehensive Metabolic Panel  Due for labs today.    Other orders  -     Influenza, Seasonal Quad, PF =/> 6months

## 2021-06-01 NOTE — PATIENT INSTRUCTIONS - HE
1) Recommend starting on Vitamin D 3,000-4,000 IU daily: Helps with energy and emotional reserve.

## 2021-06-02 VITALS — BODY MASS INDEX: 24.36 KG/M2 | WEIGHT: 137.5 LBS

## 2021-06-02 NOTE — PROGRESS NOTES
"[facility] consult with Dr. Hicks regarding her bilateral milky nipple discharge.  This is with expression only.  She is accompanied by her  for consult.  RN assessment and EMR update.  /68 (Patient Site: Left Arm, Patient Position: Sitting)   Pulse 69   Resp 16   Ht 5' 2\" (1.575 m)   Wt 150 lb (68 kg)   SpO2 97%   BMI 27.44 kg/m  .  Patient met with Dr. Hicks, see dictation for details and follow up plan. RN time 12 mins  "

## 2021-06-02 NOTE — PROGRESS NOTES
"This is a 39 y.o. woman whom asked to see by Cindy Ibarra MD for evaluation of bilateral nipple discharge. She describes it as milky.  This has been going on since she stopped nursing 2 years ago.  She states that shortly after she stopped nursing she felt like her breast became full and the only thing that helped it was by squeezing her nipples and helping get some milk out.  She also did get an episode of mastitis that again was only improved by her basically helping squeeze some of the milk out.  She now gets milk out any time she checks for it by squeezing her nipples.  It never happens spontaneously.  She had mammograms done about a year ago that were normal.    Family History: She has no family history of breast cancer.    PMH:  Past Medical History:   Diagnosis Date     Anxiety      Drug dependence, in remission (H)     marijuana and alcohol, sober since 2005     Hyperlipidemia        Medications:  (Not in a hospital admission)    Allergies:  Allergies   Allergen Reactions     Banana Extract Nausea And Vomiting       Social History:   reports that she quit smoking about 12 years ago. She smoked 0.00 packs per day. She has never used smokeless tobacco. She reports that she does not drink alcohol or use drugs.      Review of systems is otherwise negative for full 12 point review of systems.    Physical exam:  /68 (Patient Site: Left Arm, Patient Position: Sitting)   Pulse 69   Resp 16   Ht 5' 2\" (1.575 m)   Wt 150 lb (68 kg)   SpO2 97%   BMI 27.44 kg/m    General: alert, appears stated age and cooperative  Breast: No palpable masses in either breast.  No significant fullness.  No obvious drainage from the nipples although I did not squeeze her nipples to try to get anything out.  Axilla: No axillary adenopathy.    Reviewed her mammograms from last year that are unremarkable.    Impression:  Bilateral nipple discharge.  I reassured her that this is all completely normal.  Its not uncommon in my best " advice for her is to stop squeezing her nipples.  Explained that every time she is squeezing her nipples, she tells her brain that a baby is trying to get milk.  The only way this is going to stop his if she absolutely stop squeezing.  She is worried about the fullness she feels and I told her this is completely normal.  Some people just always have this after the nurse.  It should eventually go down but it is only good to go down if she stops trying to get milk out..    Plan: Follow-up with me as needed.  If she starts getting spontaneous discharge then I would repeat the prolactin level.  Reassured her emphatically that this is not a sign of breast cancer or any type of breast pathology.

## 2021-06-03 VITALS — HEIGHT: 62 IN | WEIGHT: 150 LBS | BODY MASS INDEX: 27.6 KG/M2

## 2021-06-03 VITALS
OXYGEN SATURATION: 99 % | TEMPERATURE: 97.9 F | BODY MASS INDEX: 26.59 KG/M2 | WEIGHT: 144.5 LBS | HEART RATE: 69 BPM | RESPIRATION RATE: 16 BRPM | HEIGHT: 62 IN | DIASTOLIC BLOOD PRESSURE: 62 MMHG | SYSTOLIC BLOOD PRESSURE: 116 MMHG

## 2021-06-05 ENCOUNTER — COMMUNICATION - HEALTHEAST (OUTPATIENT)
Dept: FAMILY MEDICINE | Facility: CLINIC | Age: 41
End: 2021-06-05

## 2021-06-05 VITALS
HEART RATE: 80 BPM | WEIGHT: 151 LBS | BODY MASS INDEX: 28.51 KG/M2 | SYSTOLIC BLOOD PRESSURE: 112 MMHG | HEIGHT: 61 IN | DIASTOLIC BLOOD PRESSURE: 68 MMHG

## 2021-06-05 DIAGNOSIS — F41.9 ANXIETY: ICD-10-CM

## 2021-06-05 NOTE — TELEPHONE ENCOUNTER
Patient calling - says she was already triaged for her symptoms yesterday and told to make an Oncare appointment.  She did this and was diagnosed with an upper respiratory infection and prescribed two medications.  She was not prescribed Tamiflu.  Says she is not high risk.  Does not have a fever.  Just has a cough.      Advised patient to call back if she has difficulty breathing or symptoms worsen.    Yessy Payne RN  Triage Nurse Advisor    Reason for Disposition    [1] Follow-up call to recent contact AND [2] information only call, no triage required    Protocols used: INFORMATION ONLY CALL-A-

## 2021-06-05 NOTE — TELEPHONE ENCOUNTER
Refill Approved    Rx renewed per Medication Renewal Policy. Medication was last renewed on 10/2/19- pharmacy change.    Moni Reyes, [facility] Triage/Med Refill 1/10/2020     Requested Prescriptions   Pending Prescriptions Disp Refills     citalopram (CELEXA) 20 MG tablet 90 tablet 3     Sig: TAKE 1 TABLET DAILY       SSRI Refill Protocol  Passed - 1/8/2020  9:08 AM        Passed - PCP or prescribing provider visit in last year     Last office visit with prescriber/PCP: 10/10/2018 Cindy Ibarra MD OR same dept: Visit date not found OR same specialty: 10/10/2018 Cindy Ibarra MD  Last physical: 10/2/2019 Last MTM visit: Visit date not found   Next visit within 3 mo: Visit date not found  Next physical within 3 mo: Visit date not found  Prescriber OR PCP: Cindy Ibarra MD  Last diagnosis associated with med order: 1. Anxiety  - citalopram (CELEXA) 20 MG tablet; TAKE 1 TABLET DAILY  Dispense: 90 tablet; Refill: 3    If protocol passes may refill for 12 months if within 3 months of last provider visit (or a total of 15 months).

## 2021-06-05 NOTE — TELEPHONE ENCOUNTER
Refill Request  Did you contact pharmacy: No  Medication name:   Requested Prescriptions     Pending Prescriptions Disp Refills     citalopram (CELEXA) 20 MG tablet 90 tablet 3     Sig: TAKE 1 TABLET DAILY

## 2021-06-05 NOTE — TELEPHONE ENCOUNTER
Influenza-Like Illness (MAEVE) Protocol    Phillips CASSIE Montejo      Age: 39 y.o.     YOB: 1980    Please select the type of triage protocol you used for this patient? Epic Price and Roddy or another form of electronic triage protocol was used.     Influenza-Like Illness (MAEVE) Standing Order    Has the patient been seen by a primary care provider at an [facility] within the past two years? Yes     Do any of the following exclusions apply to the patient?  Does the patient have a history of CrCl less than or equal to 60 ml/min No   Is the patient taking Probenecid No   Was an Intranasal live attenuated influenza vaccine (LAIV) received by the patient 2 weeks before antiviral medication No   Was an LAIV received 48 hours after administration of influenza antiviral drugs, if planning on obtaining? No     Does this patient have ANY of the above exclusions answered Yes?  No.      Is this patient currently showing symptoms of Influenza like Illness (MAEVE) after close proximity to someone with a verified diagnosis of Influenza, or is this patient not sick but has had known exposure within less than or equal to 48 hours through close proximity with someone that has a verified diagnosis of Influenza?  This patient is currently sick with MAEVE type symptoms     Does this patient have ANY of the following specialty conditions?  Chemotherapy or radiation within the last 3 months No   Organ or bone marrow transplant No   Metabolic disorder No   HIV patient with CD4 count <200 No     Does this patient have ANY of the above specialty conditions? No     Have the symptoms of MAEVE been present for less than or equal to 48 hours? No, the symptoms have been present for longer than 48 hours. Recommend a virtual visit such as an Oncare appointment age 1 to 65, or a telephone visit with the provider (LIP).  If virtual visit is not available, consider an in-office  visit with provider based on same or next day access.    Patient advised to go online, [email or web address] for telephone visit. And to get Tamiflu.   patient agreed  To process.   She does not qualify for Tamiflu  Through Triage RN.    Casie Leblanc RN  [facility] Triage/refill nurse                      Additional educational resources include:    [email or web address]    http://www.cdc.gov/flu/  Casie Leblanc

## 2021-06-11 NOTE — TELEPHONE ENCOUNTER
Due to be seen    Rx renewed per Medication Renewal Policy. Medication was last renewed on 1/10/20.    Jelly Antunez, [facility] Triage/Med Refill 9/18/2020     Requested Prescriptions   Pending Prescriptions Disp Refills     citalopram (CELEXA) 20 MG tablet [Pharmacy Med Name: CITALOPRAM TAB 20MG] 90 tablet 2     Sig: TAKE 1 TABLET DAILY       SSRI Refill Protocol  Passed - 9/17/2020  6:44 AM        Passed - PCP or prescribing provider visit in last year     Last office visit with prescriber/PCP: 10/10/2018 Cindy Ibarra MD OR same dept: Visit date not found OR same specialty: 10/10/2018 Cindy Ibarra MD  Last physical: 10/2/2019 Last MTM visit: Visit date not found   Next visit within 3 mo: Visit date not found  Next physical within 3 mo: Visit date not found  Prescriber OR PCP: Cindy Ibarra MD  Last diagnosis associated with med order: 1. Anxiety  - citalopram (CELEXA) 20 MG tablet [Pharmacy Med Name: CITALOPRAM TAB 20MG]; TAKE 1 TABLET DAILY  Dispense: 90 tablet; Refill: 2    If protocol passes may refill for 12 months if within 3 months of last provider visit (or a total of 15 months).

## 2021-06-13 NOTE — TELEPHONE ENCOUNTER
Spoke with patient and notified her of your message in regards to the diflucan.  Patient states she was supposed to receive a topical cream for her eczema on her hands. She states she has been using hydrocortisone cream over the counter without benefit.    I am anticipating you will send in triamcinolone cream therefore I have pended this for approval      Please send both prescriptions to the [facility] in [facility] [address] in [place].

## 2021-06-13 NOTE — TELEPHONE ENCOUNTER
Refill Request  This medication was sent to the wrong pharmacy.    Please send to the [facility].    Okay to leave a detailed message: yes

## 2021-06-13 NOTE — TELEPHONE ENCOUNTER
Medication Request  Medication name: steroid cream  Requested Pharmacy: [facility]  Reason for request: States was discussed at today's office visit with Dr Doll.    Okay to leave a detailed message: yes

## 2021-06-13 NOTE — TELEPHONE ENCOUNTER
Called pt to discuss the yeast seen on wet prep yesterday. Recommend treatment with a single dose of diflucan, additional dose in 4 days if symptoms have not improved. Left message.

## 2021-06-15 PROBLEM — G43.709 CHRONIC MIGRAINE WITHOUT AURA: Status: ACTIVE | Noted: 2017-10-04

## 2021-06-15 PROBLEM — E78.00 HYPERCHOLESTEROLEMIA: Status: ACTIVE | Noted: 2017-10-04

## 2021-06-15 PROBLEM — F41.1 GENERALIZED ANXIETY DISORDER: Status: ACTIVE | Noted: 2017-10-04

## 2021-06-15 PROBLEM — A60.00 GENITAL HERPES: Status: ACTIVE | Noted: 2017-10-04

## 2021-06-16 PROBLEM — F43.20 ANTICIPATORY GRIEF: Status: ACTIVE | Noted: 2020-11-13

## 2021-06-16 NOTE — TELEPHONE ENCOUNTER
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  It was given at a pop up vaccine clinic at [facility] in [place].  They had some leftover vaccines that day, so the volunteers were offered a dose.   She is due for her second dose on 4/5/21.  The day she received the vaccine they were administering all second doses only, and [facility] is not scheduling any more pop up vaccine clinics, so she is unable to get her second dose there.    She needs to schedule her second dose, but she does not fall into the strict parameters where an RN can place the order.    Question:  What would Dr. Doll suggest?  Can Dr. Doll place the order?  Alan is awaiting a call back today at [phone]    Guerda Ochoa RN  [facility] Nurse Advisor    Additional Information    Negative: [1] Difficulty breathing or swallowing AND [2] starts within 2 hours after injection    Negative: Sounds like a life-threatening emergency to the triager    Negative: [1] COVID-19 exposure AND [2] no symptoms    Negative: [1] Typical COVID-19 symptoms AND [2] symptoms that are NOT expected from vaccine (e.g., cough, difficulty breathing, loss of taste or smell, runny nose, sore throat)    Negative: [1] Typical COVID-19 symptoms AND [2] started > 3 days after getting vaccine    Negative: Fever > 104 F (40 C)    Negative: Sounds like a severe, unusual reaction to the triager    Negative: [1] Redness or red streak around the injection site AND [2] started > 48 hours after getting vaccine AND [3] fever    Negative: [1] Fever > 101 F (38.3 C) AND [2] age > 60 AND [3] started > 48 hours after getting vaccine    Negative: [1] Fever > 100.0 F (37.8 C) AND [2] bedridden (e.g., nursing home patient, CVA, chronic illness, recovering from surgery) AND [3] started > 48 hours after getting vaccine    Negative: [1] Fever > 100.0 F (37.8 C) AND [2] diabetes mellitus or weak  immune system (e.g., HIV positive, cancer chemo, splenectomy, organ transplant, chronic steroids) AND [3] started > 48 hours after getting vaccine    Negative: [1] Redness or red streak around the injection site AND [2] started > 48 hours after getting vaccine AND [3] no fever  (Exception: red area < 1 inch or 2.5 cm wide)    Negative: [1] Pain, tenderness, or swelling at the injection site AND [2] over 3 days (72 hours) since vaccine AND [3] getting worse    Negative: Fever > 100.0 F (37.8 C) present > 3 days (72 hours)    Negative: [1] Fever > 100.0 F (37.8 C) AND [2] healthcare worker    Negative: [1] Pain, tenderness, or swelling at the injection site AND [2] lasts > 7 days    Negative: [1] Requesting COVID-19 vaccine AND [2] healthcare worker (e.g., EMS first responders, doctors, nurses)    Negative: [1] Requesting COVID-19 vaccine AND [2] resident of a long-term care facility (e.g., nursing home)    Negative: [1] Requesting COVID-19 vaccine AND [2] vaccine available in the community for this patient group    Negative: COVID-19 vaccine, injection site reaction (e.g., pain, redness, swelling), question about    Negative: COVID-19 vaccine, systemic reactions (e.g., fatigue, fever, muscle aches), questions about    Negative: COVID-19 vaccine, Frequently Asked Questions (FAQs)    Negative: COVID-19 Prevention and Healthy Living, questions about    Protocols used: CORONAVIRUS (COVID-19) VACCINE QUESTIONS AND [id number] 1.3.21

## 2021-06-19 NOTE — LETTER
Letter by Cindy Ibarra MD at      Author: Cindy Ibarra MD Service: -- Author Type: --    Filed:  Encounter Date: 10/15/2019 Status: Signed         Alan Montejo  [address] [place] [address]      October 15, 2019      Dear Ms. Montejo,    Good news, your pap smear is normal and your HPV is negative.  That means your next pap is in 5 years.  We continue to recommend annual physical exams if you have any chronic medical conditions or are prescribed any medications.       Sincerely,        Electronically signed by Cindy Ibarra MD

## 2021-06-20 NOTE — PROGRESS NOTES
Patient is a 38-year-old female presents today with concern about breast discharge.     Patient notes that she stopped nursing approximately 1 year ago, but she still having some slight milky discharge.  She does have to actively work to express it.  She denies any fevers or chills.  She does have a small area of erythema that is in a perfect [place] at about 7 to 8:00 on her left breast that has been present for the past 3 months.  She thinks possibly it is grown a little.  She notes that on her left side, that her armpit is a little bit itchy.  She denies any fevers or chills, no weight loss.    As far as family history, she notes her grandmother  of breast cancer in her early 50s, but she was a smoker and alcoholic.  Her been no onset or her mother with issues with regards to breast cancer.  No one in her family has ovarian cancer.      Anxiety: She notes that she also needs a refill of her citalopram.  She has a known history of generalized anxiety disorder.  When she found out she was pregnant, she had weaned down from 40 mg to 20 mg tablet taking just half a tablet daily.  She remains on the 20 mg, and would like a refill today.  She is adjusting to motherhood well.  Her  is stay at home.  She is continuing to work full time.  She notes no side effects of the medication.  She did not have any issues with postpartum depression.  When she was pregnant, she had this refilled by her OB/GYN, but is no longer seeing them.      Objective     BP (!) 88/62 (Patient Site: Left Arm, Patient Position: Sitting, Cuff Size: Adult Regular)  Pulse 73  Temp 98.1  F (36.7  C) (Tympanic)   Wt 137 lb 8 oz (62.4 kg)  SpO2 98%  Breastfeeding? No  BMI 24.36 kg/m2  General appearance: alert, appears stated age and cooperative  Breasts: right breast is normal without masses.  Left breast reveals a small 2 cm area of hyperemia at 7-8 oclock.  It is nontender.  There are no palpable lumps, but the breast tissue is  slightly more nodular on the left compared to there right.  No axillary lymphadenopathy.  There is a small amount of clear nipple discharge.    Psych Exam:  Behavior:normal    Mood:pleaseant, upbeat   Affect:normal   Eye Contact:normal   Thought Content: normal   Insight:normal    Judgement: normal          Alan was seen today for breast discharge and flu vaccine.    Diagnoses and all orders for this visit:    Breast pain  -     dicloxacillin (DYNAPEN) 500 MG capsule; Take 1 capsule (500 mg total) by mouth 3 (three) times a day for 10 days.  -     Mammo Diagnostic Bilateral; Future  -     Cancel: US Breast Limited (Focal) Bilateral; Future  The area of hyperemia is the most bothersome to the patient.  Do not detect any lumps at this time.  Under if the area of hyperemia might be a clogged milk duct.  I will treat empirically with dicloxacillin.  We will send her to the breast center for diagnostic mammogram and ultrasound, with future follow-up pending those results.    Anxiety  -     citalopram (CELEXA) 20 MG tablet; Take 1 tablet (20 mg total) by mouth daily.  Controlled on half a tablet of 40 mg tablets.  Patient would like to switch to a 20 mg tablet at this time.  We will refill that today.

## 2021-06-21 NOTE — LETTER
Letter by Senia Doll MD at      Author: Senia Doll MD Service: -- Author Type: --    Filed:  Encounter Date: 11/16/2020 Status: (Other)         Alan Montejo  [address] [place] [address]             November 16, 2020         Dear Ms. Montejo,    Below are the results from your recent visit:    Resulted Orders   Lipid Cascade   Result Value Ref Range    Cholesterol 220 (H) <=199 mg/dL    Triglycerides 77 <=149 mg/dL    HDL Cholesterol 58 >=50 mg/dL    LDL Calculated 147 (H) <=129 mg/dL    Patient Fasting > 8hrs? No    Glycosylated Hemoglobin A1c   Result Value Ref Range    Hemoglobin A1c 5.2 <=5.6 %      Comment:      Prediabetes:   HBA1c       5.7 to 6.4%        Diabetes:        HBA1c        >=6.5%   Patients with Hgb F >5%, total bilirubin >10.0 mg/dL, abnormal red cell turnover, severe renal or hepatic disease or malignancy should not have this A1C method used to diagnose or monitor diabetes.       Thyroid Stimulating Hormone (TSH)   Result Value Ref Range    TSH 2.22 0.30 - 5.00 uIU/mL   Prolactin   Result Value Ref Range    Prolactin 7.1 0.0 - 20.0 ng/mL   Wet Prep, Vaginal   Result Value Ref Range    Yeast Result Yeast Seen (!) No yeast seen    Trichomonas No Trichomonas seen No Trichomonas seen    Clue Cells, Wet Prep No Clue cells seen No Clue cells seen        It was a pleasure to see you in the office the other day.      I reviewed your recent lab results and all of them came back within normal limits. Slight improvement in  cholesterol level but will recommend it does need some extra focus with a low cholesterol diet.    Vaginal yeast infection was seen, which I'm assuming you started treatment .     Please call with questions or contact us using [employer]    Sincerely,        Electronically signed by Senia Doll MD

## 2021-06-25 NOTE — TELEPHONE ENCOUNTER
Refill Approved    Rx renewed per Medication Renewal Policy. Medication was last renewed on 3/15/21.    Eduin Soriano, [facility] Triage/Med Refill 6/7/2021     Requested Prescriptions   Pending Prescriptions Disp Refills     citalopram (CELEXA) 20 MG tablet [Pharmacy Med Name: CITALOPRAM HBR 20 MG TABLET] 90 tablet 0     Sig: TAKE 1 TABLET BY MOUTH EVERY DAY       [facility] Refill Protocol  Passed - 6/5/2021  9:27 AM        Passed - PCP or prescribing provider visit in last year     Last office visit with prescriber/PCP: 11/13/2020 Senia Doll MD OR same dept: 11/13/2020 Senia Doll MD OR same specialty: 11/13/2020 Senia Doll MD  Last physical: Visit date not found Last MTM visit: Visit date not found   Next visit within 3 mo: Visit date not found  Next physical within 3 mo: Visit date not found  Prescriber OR PCP: Senia Doll MD  Last diagnosis associated with med order: 1. Anxiety  - citalopram (CELEXA) 20 MG tablet [Pharmacy Med Name: CITALOPRAM HBR 20 MG TABLET]; TAKE 1 TABLET BY MOUTH EVERY DAY  Dispense: 90 tablet; Refill: 0    If protocol passes may refill for 12 months if within 3 months of last provider visit (or a total of 15 months).

## 2021-06-29 NOTE — PROGRESS NOTES
Progress Notes by Senia Doll MD at 11/13/2020  9:00 AM     Author: Senia Doll MD Service: -- Author Type: Physician    Filed: 11/13/2020  1:50 PM Encounter Date: 11/13/2020 Status: Signed    : Senia Doll MD (Physician)       FEMALE PREVENTATIVE EXAM    Assessment and Plan:     Patient has been advised of split billing requirements and indicates understanding: Yes    Alan was seen today for annual exam, contraception, vaginal itching and establish care.    Routine general medical examination at a health care facility    I discussed the following with the patient:   Adult Healthy Living: Importance of regular exercise  Healthy nutrition  Getting adequate sleep  Stress management  Supplement use  Herbal medications/alternative medical therapies    Generalized anxiety disorder  Continue citalopram at current dose    Encounter for IUD removal  Informed consent done.  Sterile speculum exam was done cervix was cleaned with iodine IUD string was identified and pulled out without any complication    Screening for metabolic disorder  -     Lipid Cascade  -     Glycosylated Hemoglobin A1c    Galactorrhea  -     Thyroid Stimulating Hormone (TSH)  -     Prolactin    Anticipatory grief  Patient was advised to reach out in case she need any extra help with grief counselor    Vaginal discharge  -     Wet Prep, Vaginal        Next follow up:  1 yr for annual physical    Immunization Reviewed and if needed ordered please see A/P    There are no preventive care reminders to display for this patient.    Immunization History   Administered Date(s) Administered   ? DTaP, 5 Pertussis 1981, 1981, 1981, 03/09/1983, 06/11/1996   ? Hep A, historic 02/28/2011, 08/14/2014   ? Hep B, Adult 02/28/2011   ? Hep B, Peds or Adolescent 08/05/1996, 02/12/1997   ? INFLUENZA,SEASONAL QUAD, PF, =/> 6months 11/02/2016, 10/02/2019, 10/01/2020, 10/02/2020   ? IPV 1981, 1981, 03/09/1983   ? Influenza, inj,  historic,unspecified 10/22/2009, 10/02/2010, 11/08/2012, 11/11/2014   ? Influenza,seasonal, Inj IIV3 11/04/2017   ? Influenza,seasonal,quad inj =/> 6months 10/08/2015   ? MMR 08/20/1982, 09/08/2016   ? Tdap 02/28/2011, 07/22/2016         The following high BMI interventions were performed this visit: dietary management education, guidance, and counseling    I have had an Advance Directives discussion with the patient.    Subjective:   Chief Complaint: Alan Montejo is an 40 y.o. female here for a preventative health visit.     HPI: Very pleasant patient who is new to my practice like to establish care.  Her chief complaint to get her IUD removed which was placed around 2 years ago constant discomfort but otherwise no heavy or irregular bleeding patient planning to have her  get vasectomy as she does not want to do any hormonal treatment anymore  Also complaining of vaginal itching and discharge going on for couple weeks just like to make sure is not a yeast infection    Patient continue to struggle with the [place] for last 2 years since her last delivery, had prolactin and mammogram level done in the past which were normal  Patient also struggling with anticipatory grief as her mother recently passed away quite emotional in the clinic today but she feels she is doing overall well does not need any extra help through counseling     And does have a generalized anxiety disorder takes citalopram daily which overall works very well for her besides his recent change secondary to the passing of her mother.     Patient's last menstrual period was 10/30/2020 (approximate).     PHQ-9 Total Score: 5 (11/13/2020  1:00 PM)     HEVER 7 Total Score: 2 (11/13/2020  1:00 PM)       Social History     Social History Narrative   ? Not on file      Healthy Habits  Are you taking a daily aspirin? No  Do you typically exercising at least 40 min, 3-4 times per week?  Yes  Do you usually eat at least 4 servings of fruit and  "vegetables a day, include whole grains and fiber and avoid regularly eating high fat foods? Yes  Have you had an eye exam in the past two years? Yes  Do you see a dentist twice per year? Yes  Do you have any concerns regarding sleep? No    Safety Screen  If you own firearms, are they secured in a locked gun cabinet or with trigger locks? The patient does not own any firearms    Do you feel you are safe where you are living?: Yes (11/13/2020  9:29 AM)  Do you feel you are safe in your relationship(s)?: Yes (11/13/2020  9:29 AM)      Review of Systems:  Please see above.  The rest of the review of systems are negative for all systems.     Pap History:   Yes - updated in Problem List and Health Maintenance accordingly    Cancer Screening       Status Date      PAP SMEAR Next Due 10/2/2024      Done 10/2/2019 GYNECOLOGIC CYTOLOGY (PAP SMEAR)     Patient has more history with this topic...          Patient Care Team:  Cindy Ibarra MD as PCP - General  Cindy Ibarra MD as Assigned PCP      History     Reviewed By Date/Time Sections Reviewed    Sheri Rock CMA 11/13/2020  9:28 AM Tobacco             Objective:   Vital Signs:   Visit Vitals  /68 (Patient Site: Left Arm, Patient Position: Sitting, Cuff Size: Adult Large)   Pulse 80   Ht 5' 1.42\" (1.56 m)   Wt 151 lb (68.5 kg)   LMP 10/30/2020 (Approximate)   Breastfeeding No   BMI 28.15 kg/m         PHYSICAL EXAM  Physical Exam:  General Appearance:  Appears comfortable, Alert, cooperative, no distress,   Head: Normocephalic, without obvious abnormality, atraumatic  Eyes: PERRL, conjunctiva/corneas clear, EOM's intact, both eyes             Nose: Nares normal, no drainage   Throat: Lips, mucosa, and tongue normal; teeth and gums normal  Neck: Supple, symmetrical, trachea midline, no adenopathy;                      Lungs: Clear to auscultation bilaterally, respirations unlabored  Pelvic exam: normal external genitalia, vulva, vagina, cervix, uterus and adnexa, " IUD string identified .  Heart: Regular rate and rhythm, S1 and S2 normal, no murmur, rubs or gallop  Extremities: Extremities normal, atraumatic, no cyanosis or edema  Pulses: DP pulses are 1-2+ bilat.    Skin: no rashes or lesions  Neurologic: normal and equal strength bilat in upper and lower extremities                 Medication List          Accurate as of November 13, 2020  1:49 PM. If you have any questions, ask your nurse or doctor.            START taking these medications    fluconazole 150 MG tablet  Also known as: DIFLUCAN  INSTRUCTIONS: Take 1 tablet (150 mg total) by mouth once for 1 dose.  Stop taking on: November 13, 2020  Started by: Arabella Jones MD           CONTINUE taking these medications    citalopram 20 MG tablet  Also known as: celeXA  INSTRUCTIONS: TAKE 1 TABLET DAILY        MIRENA UTRN  INSTRUCTIONS: by Intrauterine route.        MULTIVITAL ORAL  INSTRUCTIONS: Take by mouth.        PROBIOTIC ORAL  INSTRUCTIONS: Take by mouth.        valACYclovir 1000 MG tablet  Also known as: VALTREX  INSTRUCTIONS: Take 1 tablet (1,000 mg total) by mouth every 12 (twelve) hours.           STOP taking these medications    Flucelvax Quad [id number] (PF) 60 mcg (15 mcg x 4)/0.5 mL Syrg  Generic drug: flu vac qs 2020(4 yr up)CD(PF)  Stopped by: Senia Doll MD           Where to Get Your Medications      [facility] - [place], [place] - [address] AT [facility] to [facility]  [address], [facility] [address]    Phone: [phone]     fluconazole 150 MG tablet         Additional Screenings Completed Today:

## 2021-09-04 ENCOUNTER — HEALTH MAINTENANCE LETTER (OUTPATIENT)
Age: 41
End: 2021-09-04

## 2021-12-25 ENCOUNTER — HEALTH MAINTENANCE LETTER (OUTPATIENT)
Age: 41
End: 2021-12-25

## 2022-02-07 ENCOUNTER — OFFICE VISIT (OUTPATIENT)
Dept: FAMILY MEDICINE | Facility: CLINIC | Age: 42
End: 2022-02-07
Payer: COMMERCIAL

## 2022-02-07 VITALS
SYSTOLIC BLOOD PRESSURE: 110 MMHG | OXYGEN SATURATION: 98 % | DIASTOLIC BLOOD PRESSURE: 70 MMHG | HEIGHT: 62 IN | WEIGHT: 152.9 LBS | HEART RATE: 78 BPM | BODY MASS INDEX: 28.14 KG/M2

## 2022-02-07 DIAGNOSIS — Z01.419 ENCOUNTER FOR GYNECOLOGICAL EXAMINATION WITHOUT ABNORMAL FINDING: ICD-10-CM

## 2022-02-07 DIAGNOSIS — Z13.1 SCREENING FOR DIABETES MELLITUS: ICD-10-CM

## 2022-02-07 DIAGNOSIS — Z13.220 LIPID SCREENING: ICD-10-CM

## 2022-02-07 DIAGNOSIS — Z00.00 ROUTINE GENERAL MEDICAL EXAMINATION AT A HEALTH CARE FACILITY: Primary | ICD-10-CM

## 2022-02-07 DIAGNOSIS — Z12.31 VISIT FOR SCREENING MAMMOGRAM: ICD-10-CM

## 2022-02-07 DIAGNOSIS — F41.1 GENERALIZED ANXIETY DISORDER: ICD-10-CM

## 2022-02-07 LAB
CHOLEST SERPL-MCNC: 229 MG/DL
FASTING STATUS PATIENT QL REPORTED: YES
FASTING STATUS PATIENT QL REPORTED: YES
GLUCOSE BLD-MCNC: 82 MG/DL (ref 70–125)
HDLC SERPL-MCNC: 54 MG/DL
LDLC SERPL CALC-MCNC: 154 MG/DL
TRIGL SERPL-MCNC: 103 MG/DL

## 2022-02-07 PROCEDURE — 82947 ASSAY GLUCOSE BLOOD QUANT: CPT | Performed by: FAMILY MEDICINE

## 2022-02-07 PROCEDURE — 36415 COLL VENOUS BLD VENIPUNCTURE: CPT | Performed by: FAMILY MEDICINE

## 2022-02-07 PROCEDURE — G0123 SCREEN CERV/VAG THIN LAYER: HCPCS | Performed by: FAMILY MEDICINE

## 2022-02-07 PROCEDURE — 87624 HPV HI-RISK TYP POOLED RSLT: CPT | Performed by: FAMILY MEDICINE

## 2022-02-07 PROCEDURE — 80061 LIPID PANEL: CPT | Performed by: FAMILY MEDICINE

## 2022-02-07 PROCEDURE — 99396 PREV VISIT EST AGE 40-64: CPT | Performed by: FAMILY MEDICINE

## 2022-02-07 RX ORDER — MULTIPLE VITAMINS W/ MINERALS TAB 9MG-400MCG
1 TAB ORAL DAILY
COMMUNITY

## 2022-02-07 RX ORDER — CETIRIZINE HYDROCHLORIDE 10 MG/1
10 TABLET ORAL DAILY
COMMUNITY

## 2022-02-07 RX ORDER — CITALOPRAM HYDROBROMIDE 20 MG/1
20 TABLET ORAL DAILY
Qty: 90 TABLET | Refills: 3 | Status: SHIPPED | OUTPATIENT
Start: 2022-02-07

## 2022-02-07 ASSESSMENT — MIFFLIN-ST. JEOR: SCORE: 1316.8
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
## 2022-02-09 LAB
HUMAN PAPILLOMA VIRUS 16 DNA: NEGATIVE
HUMAN PAPILLOMA VIRUS 18 DNA: NEGATIVE
HUMAN PAPILLOMA VIRUS FINAL DIAGNOSIS: NORMAL
HUMAN PAPILLOMA VIRUS OTHER HR: NEGATIVE

## 2022-02-11 LAB
BKR LAB AP GYN ADEQUACY: NORMAL
BKR LAB AP GYN INTERPRETATION: NORMAL
BKR LAB AP HPV REFLEX: NORMAL
BKR LAB AP LMP: NORMAL
BKR LAB AP PREVIOUS ABNORMAL: NORMAL
PATH REPORT.COMMENTS IMP SPEC: NORMAL
PATH REPORT.COMMENTS IMP SPEC: NORMAL
PATH REPORT.RELEVANT HX SPEC: NORMAL

## 2022-04-25 ENCOUNTER — ANCILLARY PROCEDURE (OUTPATIENT)
Dept: MAMMOGRAPHY | Facility: CLINIC | Age: 42
End: 2022-04-25
Attending: FAMILY MEDICINE
Payer: COMMERCIAL

## 2022-04-25 DIAGNOSIS — Z12.31 VISIT FOR SCREENING MAMMOGRAM: ICD-10-CM

## 2022-04-25 PROCEDURE — 77067 SCR MAMMO BI INCL CAD: CPT | Mod: TC | Performed by: RADIOLOGY

## 2022-04-25 PROCEDURE — 77063 BREAST TOMOSYNTHESIS BI: CPT | Mod: TC | Performed by: RADIOLOGY

## 2022-10-22 ENCOUNTER — HEALTH MAINTENANCE LETTER (OUTPATIENT)
Age: 42
End: 2022-10-22

## 2022-11-22 ENCOUNTER — VIRTUAL VISIT (OUTPATIENT)
Dept: INTERNAL MEDICINE | Facility: CLINIC | Age: 42
End: 2022-11-22
Payer: COMMERCIAL

## 2022-11-22 ENCOUNTER — LAB (OUTPATIENT)
Dept: FAMILY MEDICINE | Facility: CLINIC | Age: 42
End: 2022-11-22
Attending: PHYSICIAN ASSISTANT
Payer: COMMERCIAL

## 2022-11-22 DIAGNOSIS — J02.9 SORE THROAT: ICD-10-CM

## 2022-11-22 DIAGNOSIS — R05.1 ACUTE COUGH: ICD-10-CM

## 2022-11-22 DIAGNOSIS — J06.9 UPPER RESPIRATORY TRACT INFECTION, UNSPECIFIED TYPE: Primary | ICD-10-CM

## 2022-11-22 LAB
DEPRECATED S PYO AG THROAT QL EIA: NEGATIVE
FLUAV RNA SPEC QL NAA+PROBE: NEGATIVE
FLUBV RNA RESP QL NAA+PROBE: NEGATIVE
GROUP A STREP BY PCR: NOT DETECTED
RSV RNA SPEC NAA+PROBE: NEGATIVE
SARS-COV-2 RNA RESP QL NAA+PROBE: NEGATIVE

## 2022-11-22 PROCEDURE — 87651 STREP A DNA AMP PROBE: CPT | Performed by: PHYSICIAN ASSISTANT

## 2022-11-22 PROCEDURE — 99213 OFFICE O/P EST LOW 20 MIN: CPT | Mod: 95 | Performed by: PHYSICIAN ASSISTANT

## 2022-11-22 PROCEDURE — 87637 SARSCOV2&INF A&B&RSV AMP PRB: CPT

## 2022-11-22 RX ORDER — BENZONATATE 200 MG/1
200 CAPSULE ORAL 3 TIMES DAILY PRN
Qty: 30 CAPSULE | Refills: 0 | Status: SHIPPED | OUTPATIENT
Start: 2022-11-22

## 2022-11-22 RX ORDER — CODEINE PHOSPHATE AND GUAIFENESIN 10; 100 MG/5ML; MG/5ML
1-2 SOLUTION ORAL EVERY 6 HOURS PRN
Qty: 180 ML | Refills: 0 | Status: SHIPPED | OUTPATIENT
Start: 2022-11-22

## 2022-11-22 NOTE — PROGRESS NOTES
"Alan is a 42 year old who is being evaluated via a billable video visit.      How would you like to obtain your AVS? MyChart  If the video visit is dropped, the invitation should be resent by: Text to cell phone: [phone]  Will anyone else be joining your video visit? No          Assessment & Plan     Upper respiratory tract infection, unspecified type    - benzonatate (TESSALON) 200 MG capsule; Take 1 capsule (200 mg) by mouth 3 times daily as needed for cough  - guaiFENesin-codeine (ROBITUSSIN AC) 100-10 MG/5ML solution; Take 5-10 mLs by mouth every 6 hours as needed for cough    Acute cough    - Symptomatic; Yes; 11/15/2022 Influenza A/B & SARS-CoV2 (COVID-19) Virus PCR Multiplex Nose; Future  - benzonatate (TESSALON) 200 MG capsule; Take 1 capsule (200 mg) by mouth 3 times daily as needed for cough  - guaiFENesin-codeine (ROBITUSSIN AC) 100-10 MG/5ML solution; Take 5-10 mLs by mouth every 6 hours as needed for cough    Sore throat    - Streptococcus A Rapid Screen w/Reflex to PCR - Clinic Collect  - Group A Streptococcus PCR Throat Swab             BMI:   Estimated body mass index is 27.97 kg/m  as calculated from the following:    Height as of 2/7/22: 1.575 m (5' 2\").    Weight as of 2/7/22: 69.4 kg (152 lb 14.4 oz).   Weight management plan: Patient was referred to their PCP to discuss a diet and exercise plan.    Will notify of results via my chart  Reviewed OTC for symptoms and meds as above     Return in about 2 weeks (around 12/6/2022) for recheck if not improving, regular primary provider.    CHRISTOPHER Rebollar [facility]    Subjective   Alan is a 42 year old, presenting for the following health issues:  URI      HPI             HPI     Acute Illness  Acute illness concerns: Strep?  Onset/Duration: Over a week  Symptoms:  Fever: No  Chills/Sweats: No  Headache (location?): No  Sinus Pressure: YES  Conjunctivitis:  No  Ear Pain: YES: bilateral  Rhinorrhea: " YES  Congestion: YES  Sore Throat: YES  Cough: YES-productive of clear sputum  Wheeze: No  Decreased Appetite: YES  Nausea: YES from phlegm  Vomiting: No  Diarrhea: No  Dysuria/Freq.: No  Dysuria or Hematuria: No  Fatigue/Achiness: YES  Sick/Strep Exposure: YES- Has six year old twins, Had RSV a month ago in house, Was around Influenza recently          Review of Systems         Objective    Vitals - Patient Reported  Weight (Patient Reported): 68.9 kg (152 lb)        Physical Exam   GENERAL: Healthy, alert and no distress  EYES: Eyes grossly normal to inspection.  No discharge or erythema, or obvious scleral/conjunctival abnormalities.  RESP: No audible wheeze, cough, or visible cyanosis.  No visible retractions or increased work of breathing.    SKIN: Visible skin clear. No significant rash, abnormal pigmentation or lesions.  NEURO: Cranial nerves grossly intact.  Mentation and speech appropriate for age.  PSYCH: Mentation appears normal, affect normal/bright, judgement and insight intact, normal speech and appearance well-groomed.    Results for orders placed or performed in visit on 11/22/22 (from the past 24 hour(s))   Streptococcus A Rapid Screen w/Reflex to PCR - Clinic Collect    Specimen: Throat; Swab   Result Value Ref Range    Group A Strep antigen Negative Negative               Video-Visit Details    Video Start Time: 1: 28 pm    Type of service:  Video Visit    Video End Time:1:39 PM    Originating Location (pt. Location): Home        Distant Location (provider location):  On-site    Platform used for Video Visit: Petey

## 2022-11-22 NOTE — PROGRESS NOTES
"Alan is a 42 year old who is being evaluated via a billable telephone visit.      What phone number would you like to be contacted at? Text invite [phone]  How would you like to obtain your AVS? Sam    {PROVIDER CHARTING PREFERENCE:739550}    Subjective   Alan is a 42 year old, presenting for the following health issues:  No chief complaint on file.    1: 28   1:37 PM        HPI     Acute Illness  Acute illness concerns: Strep?  Onset/Duration: Over a week  Symptoms:  Fever: No  Chills/Sweats: No  Headache (location?): No  Sinus Pressure: YES  Conjunctivitis:  No  Ear Pain: YES: bilateral  Rhinorrhea: YES  Congestion: YES  Sore Throat: YES  Cough: YES-productive of clear sputum  Wheeze: No  Decreased Appetite: YES  Nausea: YES from phlegm  Vomiting: No  Diarrhea: No  Dysuria/Freq.: No  Dysuria or Hematuria: No  Fatigue/Achiness: YES  Sick/Strep Exposure: YES- Has six year old twins, Had RSV a month ago in house, Was around Influenza recently      Therapies tried and outcome: None  {additonal problems for provider to add (Optional):611560}    Review of Systems   {ROS COMP (Optional):021581}      Objective           Vitals:  No vitals were obtained today due to virtual visit.    Physical Exam   {GENERAL APPEARANCE:50::\"healthy\",\"alert\",\"no distress\"}  PSYCH: Alert and oriented times 3; coherent speech, normal   rate and volume, able to articulate logical thoughts, able   to abstract reason, no tangential thoughts, no hallucinations   or delusions  Her affect is { :1301589::\"normal\"}  RESP: No cough, no audible wheezing, able to talk in full sentences  Remainder of exam unable to be completed due to telephone visits    {Diagnostic Test Results (Optional):293183}    {AMBULATORY ATTESTATION (Optional):318611}        Phone call duration: *** minutes    "

## 2023-04-01 ENCOUNTER — HEALTH MAINTENANCE LETTER (OUTPATIENT)
Age: 43
End: 2023-04-01

## 2024-06-02 ENCOUNTER — HEALTH MAINTENANCE LETTER (OUTPATIENT)
Age: 44
End: 2024-06-02

## 2024-08-11 ENCOUNTER — HEALTH MAINTENANCE LETTER (OUTPATIENT)
Age: 44
End: 2024-08-11

## 2025-06-14 ENCOUNTER — HEALTH MAINTENANCE LETTER (OUTPATIENT)
Age: 45
End: 2025-06-14